# Patient Record
Sex: FEMALE | Race: BLACK OR AFRICAN AMERICAN | URBAN - METROPOLITAN AREA
[De-identification: names, ages, dates, MRNs, and addresses within clinical notes are randomized per-mention and may not be internally consistent; named-entity substitution may affect disease eponyms.]

---

## 2017-01-25 ENCOUNTER — TELEPHONE (OUTPATIENT)
Dept: FAMILY MEDICINE | Facility: CLINIC | Age: 57
End: 2017-01-25

## 2017-01-25 NOTE — TELEPHONE ENCOUNTER
I have not see these forms, has anyone seen these forms? Called  to refax forms.      Nancy Mcleod, CMA

## 2017-01-25 NOTE — TELEPHONE ENCOUNTER
Reason for call: Form   Our goal is to have forms completed within 72 hours, however some forms may require a visit or additional information.     Who is the form from? Patient  (if other please explain)  Where did the form come from? form was faxed in  What clinic location was the form placed at? Is being faxed over to [facility]  Where was the form placed? has not been placed yet   What number is listed as a contact on the form? [phone]    Phone call message - patient request for a letter, form or note:     Date needed: as soon as possible  Done know  Has the patient signed a consent form for release of information? Not Applicable    Additional comments:  would like to talk to someone regarding getting this transportation exempt form filled out patient visit back on 9-26-16 was given several referrals and without this transportation form filled out she is unable to get to any of those appointments    Type of letter, form or note: transportation exempt form    Phone Number Pt can be reached at: Other phone number:  [phone]   Best Time: anytime  Can we leave a detailed message on this number? YES

## 2017-01-30 NOTE — TELEPHONE ENCOUNTER
I have told her that she needs to ask her oncologist or orthopedic doctor to fill out those forms.   Forms back in MA inbox.

## 2017-01-31 NOTE — TELEPHONE ENCOUNTER
Called and left message for her  to have her ask her oncologist or orthopedic dr to have forms filled out.     Nancy Mcleod, CMA

## 2017-02-06 NOTE — TELEPHONE ENCOUNTER
Los calling back and stating needs Dr Vivas to fill out forms first because she does not have transportation to go see her oncologist or orthopedic. So she has not had an appointment with either one yet due to no transportation.       Nancy Mcleod, CMA

## 2017-02-06 NOTE — TELEPHONE ENCOUNTER
RN - please respond to  instead of MA:   Patient has seen me once for her back pain. I do not have any documentation or supporting exam to document why she can not use public transportation and that is why specialist needs to provide input about it.   If  still have question, patient can see me in the clinic.

## 2017-02-06 NOTE — TELEPHONE ENCOUNTER
I called Emelyn and gave her Dr. Vivas's msg.  She asked to schedule Ayesha with Dr. Vivas for next week and so we did so.  Also she asked for oncologist phone number so I did give her phone number on referral. (N: [facility] [place] [phone] )      Lisette Bond RN

## 2017-02-21 ENCOUNTER — TELEPHONE (OUTPATIENT)
Dept: FAMILY MEDICINE | Facility: CLINIC | Age: 57
End: 2017-02-21

## 2017-02-21 NOTE — TELEPHONE ENCOUNTER
Dr Ogden is seeing patient in clinc today at [facility]  States patient is asking for a refill on her antidiarrheal medication  Is asking if Dr Vivas is OK with her giving patient a limited refill.    I explained that Dr Vivas is out of office until tomorrow  Dr Ogden said patient reports she has an appointment with Dr Vivas on Friday of this week.  Dr Ogden will give patient a 1 week supply with further refills to come from Dr Vivas.  Magy Elliott RN

## 2017-03-24 ENCOUNTER — OFFICE VISIT (OUTPATIENT)
Dept: FAMILY MEDICINE | Facility: CLINIC | Age: 57
End: 2017-03-24
Payer: MEDICARE

## 2017-03-24 VITALS
SYSTOLIC BLOOD PRESSURE: 114 MMHG | WEIGHT: 107 LBS | HEIGHT: 61 IN | HEART RATE: 54 BPM | DIASTOLIC BLOOD PRESSURE: 68 MMHG | BODY MASS INDEX: 20.2 KG/M2 | OXYGEN SATURATION: 98 % | TEMPERATURE: 98.7 F

## 2017-03-24 DIAGNOSIS — C53.9 MALIGNANT NEOPLASM OF CERVIX, UNSPECIFIED SITE (H): ICD-10-CM

## 2017-03-24 DIAGNOSIS — C18.9 MALIGNANT NEOPLASM OF COLON, UNSPECIFIED PART OF COLON (H): ICD-10-CM

## 2017-03-24 DIAGNOSIS — Z90.49 HISTORY OF COLECTOMY: ICD-10-CM

## 2017-03-24 DIAGNOSIS — F17.200 TOBACCO USE DISORDER: ICD-10-CM

## 2017-03-24 DIAGNOSIS — M47.816 ARTHRITIS, LUMBAR SPINE: Primary | ICD-10-CM

## 2017-03-24 PROCEDURE — 99214 OFFICE O/P EST MOD 30 MIN: CPT | Performed by: FAMILY MEDICINE

## 2017-03-24 RX ORDER — DIPHENOXYLATE HCL/ATROPINE 2.5-.025MG
2 TABLET ORAL
Qty: 40 TABLET | Status: CANCELLED | OUTPATIENT
Start: 2017-03-24

## 2017-03-24 RX ORDER — LOPERAMIDE HCL 2 MG
2 CAPSULE ORAL
Qty: 30 CAPSULE | Refills: 0 | Status: SHIPPED | OUTPATIENT
Start: 2017-03-24

## 2017-03-24 RX ORDER — ALBUTEROL SULFATE 90 UG/1
2 AEROSOL, METERED RESPIRATORY (INHALATION) EVERY 6 HOURS PRN
Qty: 1 INHALER | Refills: 0 | Status: SHIPPED | OUTPATIENT
Start: 2017-03-24

## 2017-03-24 RX ORDER — NICOTINE POLACRILEX 4 MG/1
20 GUM, CHEWING ORAL DAILY
Qty: 30 TABLET | Refills: 0 | Status: SHIPPED | OUTPATIENT
Start: 2017-03-24

## 2017-03-24 NOTE — MR AVS SNAPSHOT
"              After Visit Summary   3/24/2017    Ayesha Garibay    MRN: [id number]           Patient Information     Date Of Birth          1960        Visit Information        Provider Department      3/24/2017 1:40 PM Abelino Vivas MD [facility]        Today's Diagnoses     History of colectomy    -  1    Tobacco use disorder          Care Instructions    - you should follow up with all specialist on regular basis.    Lomotil (Diphenoxylate-Atropine) is a habit forming drug and you should follow up with colorectal doctor or gastroenterologist for you to continue that medication.    Regarding transportation - you should again discuss with oncologist and colorectal surgeon about ongoing transportation exempt if they think it is appropriate. I do not have any specific recommendations.            Follow-ups after your visit        Who to contact     If you have questions or need follow up information about today's clinic visit or your schedule please contact [facility] directly at [phone].  Normal or non-critical lab and imaging results will be communicated to you by arviem AGhart, letter or phone within 4 business days after the clinic has received the results. If you do not hear from us within 7 days, please contact the clinic through arviem AGhart or phone. If you have a critical or abnormal lab result, we will notify you by phone as soon as possible.  Submit refill requests through TBS or call your pharmacy and they will forward the refill request to us. Please allow 3 business days for your refill to be completed.          Additional Information About Your Visit        arviem AGhart Information     TBS lets you send messages to your doctor, view your test results, renew your prescriptions, schedule appointments and more. To sign up, go to [email or web address] . Click on \"Log in\" on the left side of the screen, which will take you to the Welcome page. Then click on " "\"Sign up Now\" on the right side of the page.     You will be asked to enter the access code listed below, as well as some personal information. Please follow the directions to create your username and password.     Your access code is: [email or web address]  Expires: 2017 11:58 AM     Your access code will  in 90 days. If you need help or a new code, please call your [place] clinic or [phone].        Care EveryWhere ID     This is your Care EveryWhere ID. This could be used by other organizations to access your [place] medical records  [id number]        Your Vitals Were     Pulse Temperature Height Pulse Oximetry BMI (Body Mass Index)       54 98.7  F (37.1  C) (Oral) 5' 1\" (1.549 m) 98% 20.22 kg/m2        Blood Pressure from Last 3 Encounters:   17 114/68   16 100/60    Weight from Last 3 Encounters:   17 107 lb (48.5 kg)   10/13/16 105 lb (47.6 kg)   16 105 lb 8 oz (47.9 kg)              Today, you had the following     No orders found for display         Today's Medication Changes          These changes are accurate as of: 3/24/17  1:58 PM.  If you have any questions, ask your nurse or doctor.               Start taking these medicines.        Dose/Directions    albuterol 108 (90 BASE) MCG/ACT Inhaler   Commonly known as:  PROAIR HFA/PROVENTIL HFA/VENTOLIN HFA   Used for:  Tobacco use disorder   Started by:  Abelino Vivas MD        Dose:  2 puff   Inhale 2 puffs into the lungs every 6 hours as needed for shortness of breath / dyspnea or wheezing Further refills as per pulmonologist.   Quantity:  1 Inhaler   Refills:  0         These medicines have changed or have updated prescriptions.        Dose/Directions    omeprazole 20 MG tablet   This may have changed:  medication strength   Used for:  History of colectomy   Changed by:  Abelino Vivas MD        Dose:  20 mg   Take 1 tablet (20 mg) by mouth daily   Quantity:  30 tablet   Refills:  0            Where " to get your medicines      These medications were sent to [facility]/pharmacy #[id number] - [place], [place] - [address]  [address] [address]     Phone:  [phone]     albuterol 108 (90 BASE) MCG/ACT Inhaler    loperamide 2 MG capsule    omeprazole 20 MG tablet                Primary Care Provider Office Phone # Fax #    Abelino Babita Vivas [phone]       [address] [address]        Thank you!     Thank you for choosing [facility]  for your care. Our goal is always to provide you with excellent care. Hearing back from our patients is one way we can continue to improve our services. Please take a few minutes to complete the written survey that you may receive in the mail after your visit with us. Thank you!             Your Updated Medication List - Protect others around you: Learn how to safely use, store and throw away your medicines at www.disposemymeds.org.          This list is accurate as of: 3/24/17  1:58 PM.  Always use your most recent med list.                   Brand Name Dispense Instructions for use    albuterol 108 (90 BASE) MCG/ACT Inhaler    PROAIR HFA/PROVENTIL HFA/VENTOLIN HFA    1 Inhaler    Inhale 2 puffs into the lungs every 6 hours as needed for shortness of breath / dyspnea or wheezing Further refills as per pulmonologist.       CALCIUM + D3 PO      Take 400 Int'l Units by mouth       diphenoxylate-atropine 2.5-0.025 MG per tablet    LOMOTIL     Take 2 tablets by mouth every 3 hours as needed for diarrhea       GABAPENTIN PO      Take 300 mg by mouth Take 1caps today, then take 1 caps twice daily tomorrow, then take one caps 3x daily       IBUPROFEN PO      Take 800 mg by mouth 3 times daily as needed for moderate pain       loperamide 2 MG capsule    IMODIUM    30 capsule    Take 1 capsule (2 mg) by mouth every 3 hours as needed for diarrhea       meloxicam 15 MG tablet    MOBIC    30 tablet    Take 1  tablet (15 mg) by mouth daily       omeprazole 20 MG tablet     30 tablet    Take 1 tablet (20 mg) by mouth daily       PROCHLORPERAZINE MALEATE PO      Take 10 mg by mouth every 6 hours as needed for nausea or vomiting

## 2017-03-24 NOTE — PATIENT INSTRUCTIONS
- you should follow up with all specialist on regular basis.    Lomotil (Diphenoxylate-Atropine) is a habit forming drug and you should follow up with colorectal doctor or gastroenterologist for you to continue that medication.    Regarding transportation - you should again discuss with oncologist and colorectal surgeon about ongoing transportation exempt if they think it is appropriate. I do not have any specific recommendations.

## 2017-03-24 NOTE — NURSING NOTE
"Chief Complaint   Patient presents with     Back Pain     COPD       Initial /68 (Cuff Size: Adult Regular)  Pulse 54  Temp 98.7  F (37.1  C) (Oral)  Ht 5' 1\" (1.549 m)  Wt 107 lb (48.5 kg)  SpO2 98%  BMI 20.22 kg/m2 Estimated body mass index is 20.22 kg/(m^2) as calculated from the following:    Height as of this encounter: 5' 1\" (1.549 m).    Weight as of this encounter: 107 lb (48.5 kg).  Medication Reconciliation: complete     Nancy Mcleod, RUIZ      "

## 2017-03-24 NOTE — PROGRESS NOTES
SUBJECTIVE:                                                    Ayesha Garibay is a 56 year old female who presents to clinic today for the following health issues:      Back Pain      Duration: ongoing         Specific cause: none    Description:   Location of pain: low back both, gluteus both and hip both  Character of pain: pt states feels electric  and intermittent  Pain radiation:radiates into the right leg and radiates into the left leg  New numbness or weakness in legs, not attributed to pain:  no     Intensity: Currently 8/10, Unable to rate pain on 0-10 scale    History:   Pain interferes with job: Not applicable  History of back problems: recurrent self limited episodes of low back pain in the past  Any previous MRI or X-rays: Yes- at [place].  Date last year  Sees a specialist for back pain:  No  Therapies tried without relief: acetaminophen (Tylenol) and NSAIDs    Alleviating factors:   Improved by: heat and massage      Precipitating factors:  Worsened by: Lifting, Bending, Standing and Sitting    Functional and Psychosocial Screen (Vikas STarT Back):      Not performed today               COPD Follow-Up    Symptoms are currently: much worse    Current fatigue or dyspnea with ambulation: worsened from baseline    Shortness of breath: much worse    Increased or change in Cough/Sputum: No    Fever(s): Yes-      Baseline ambulation without stopping to rest 1 feet, blocks. Able to walk up 0 flights of stairs without stopping to rest.    Any ER/UC or hospital admissions since your last visit? No     History   Smoking Status     Current Every Day Smoker     Packs/day: 1.00     Types: Cigarettes     Start date: 9/26/1976   Smokeless Tobacco     Not on file     Comment: 7-10 cigarettes a day      No results found for: FEV1, [id number]     Copd is getting worse - 5 yrs ago. Was also told to have a spot on lung.   Trying to quit smoking.   Quit smoking for 6 months in the past. Smoking for 43 yrs.     Knows  "whats wrong with her and does not want different specialists to tell her same. Wants to have \"old fashioned doctors\" back.    Been to oncology. Not been to pulmonologist, spine specialist, colorectal etc.     Problem list and histories reviewed & adjusted, as indicated.  Additional history: as documented    Labs reviewed in EPIC    Reviewed and updated as needed this visit by clinical staff       Reviewed and updated as needed this visit by Provider      Social History     Social History     Marital status: Single     Spouse name: N/A     Number of children: N/A     Years of education: N/A     Social History Main Topics     Smoking status: Current Every Day Smoker     Packs/day: 1.00     Types: Cigarettes     Start date: 9/26/1976     Smokeless tobacco: None      Comment: 7-10 cigarettes a day      Alcohol use Yes      Comment: 1 1/2 drinks up to 1 bottle of a week      Drug use: No     Sexual activity: Yes     Partners: Male     Other Topics Concern     Parent/Sibling W/ Cabg, Mi Or Angioplasty Before 65f 55m? Yes     mother had stroke at age 40's     Social History Narrative     No Known Allergies  Patient Active Problem List   Diagnosis     Malignant neoplasm of colon, unspecified part of colon (H)     Malignant neoplasm of cervix (H)     History of colectomy     Tobacco use disorder     Arthritis, lumbar spine     Reviewed medications, social history and  past medical and surgical history.    Review of system: for general, respiratory, CVS, GI and psychiatry negative except for noted above.     EXAM:  /68 (Cuff Size: Adult Regular)  Pulse 54  Temp 98.7  F (37.1  C) (Oral)  Ht 5' 1\" (1.549 m)  Wt 107 lb (48.5 kg)  SpO2 98%  BMI 20.22 kg/m2  Constitutional: healthy, alert and no distress   Psychiatric: mentation appears normal and affect normal/bright       ASSESSMENT / PLAN:  (M47.9) Arthritis, lumbar spine  (primary encounter diagnosis)  Comment: [place] [facility]  Plan: been to " neurosurgery over there and been to sports meds. Was advised cortisone shot, she did not follow up.     (Z90.49) History of colectomy  Comment:  With history of colon cancer. On imodium, ok to refill. Also on lomotil which is a controlled substance and I recommend her to get it from her colorectal specialist.  Plan: loperamide (IMODIUM) 2 MG capsule, omeprazole         20 MG tablet             (F17.200) Tobacco use disorder  Comment:  Ongoing. Self reported lung nodule and emphysema. Offered her to have spirometry or see pulmonologist. She is not interested in either option.  Plan: albuterol (PROAIR HFA/PROVENTIL HFA/VENTOLIN         HFA) 108 (90 BASE) MCG/ACT Inhaler             (C53.9) Malignant neoplasm of cervix, unspecified site (H)  Comment:    Plan:  Seeing oncologist    (C18.9) Malignant neoplasm of colon, unspecified part of colon (H)  Comment:    Plan:  Seeing oncologist.    We discussed she may need to have frequent follow up and regular follow up with specialist and if she has forms that needs specialist input then she should ask them to fill those out. Regarding her transportation exemption I do not have any specific medical exemption. She is certainly and understandably frustrated and I explained she may benefit from ongoing care with specialist and I can certainly coordinate her care as per my ability. She was not too excited about any follow up. Provided following information for her .       Patient Instructions   - you should follow up with all specialist on regular basis.    Lomotil (Diphenoxylate-Atropine) is a habit forming drug and you should follow up with colorectal doctor or gastroenterologist for you to continue that medication.    Regarding transportation - you should again discuss with oncologist and colorectal surgeon about ongoing transportation exempt if they think it is appropriate. I do not have any specific recommendations.

## 2017-12-24 ENCOUNTER — HEALTH MAINTENANCE LETTER (OUTPATIENT)
Age: 57
End: 2017-12-24

## 2018-02-23 ENCOUNTER — TELEPHONE (OUTPATIENT)
Dept: FAMILY MEDICINE | Facility: CLINIC | Age: 58
End: 2018-02-23

## 2018-02-23 NOTE — TELEPHONE ENCOUNTER
2/23/2018    Do not call patient for anything. She does not want any calls coming from [place].         Outreach ,  Sarika Alvarez